# Patient Record
Sex: MALE | Race: WHITE | Employment: FULL TIME | URBAN - METROPOLITAN AREA
[De-identification: names, ages, dates, MRNs, and addresses within clinical notes are randomized per-mention and may not be internally consistent; named-entity substitution may affect disease eponyms.]

---

## 2020-01-27 RX ORDER — SODIUM CHLORIDE 0.9 % (FLUSH) 0.9 %
5-40 SYRINGE (ML) INJECTION EVERY 8 HOURS
Status: CANCELLED | OUTPATIENT
Start: 2020-01-27

## 2020-01-27 RX ORDER — SODIUM CHLORIDE 0.9 % (FLUSH) 0.9 %
5-40 SYRINGE (ML) INJECTION AS NEEDED
Status: CANCELLED | OUTPATIENT
Start: 2020-01-27

## 2020-01-31 NOTE — ADDENDUM NOTE
Addended by: Johnson Mcgarry on: 1/31/2020 07:18 AM     Modules accepted: Orders CC: Luiz Melvin is a 61 y.o. male is suffering from   Chief Complaint   Patient presents with   • Wound Check     left foot wound         SUBJECTIVE:  1. Diabetic ulcer of toe of left foot associated with type 2 diabetes mellitus, with muscle involvement without evidence of necrosis (HCC)  Luiz is here for follow-up has a diabetic foot ulcers associate with the base of the left great toe.  Is been seen by podiatry appears to be improving    2. Type 2 diabetes mellitus with diabetic neuropathy, without long-term current use of insulin (HCC)  Very well controlled type 2 diabetes        Past social, family, history:   Social History     Tobacco Use   • Smoking status: Never Smoker   • Smokeless tobacco: Never Used   Substance Use Topics   • Alcohol use: Yes     Alcohol/week: 3.0 oz     Types: 6 Cans of beer per week     Comment: moderate   • Drug use: No         MEDICATIONS:    Current Outpatient Medications:   •  minocycline (MINOCIN) 50 MG Cap, Take 50 mg by mouth., Disp: , Rfl: 0  •  atorvastatin (LIPITOR) 10 MG Tab, Take 1 Tab by mouth every evening., Disp: 90 Tab, Rfl: 3  •  metFORMIN ER (GLUCOPHAGE XR) 500 MG TABLET SR 24 HR, Take 2 Tabs by mouth 2 times a day., Disp: 360 Tab, Rfl: 1  •  glipiZIDE (GLUCOTROL) 5 MG Tab, Take 1 Tab by mouth 2 times a day. TAKE 1 TAB BY MOUTH 2 TIMES A DAY., Disp: 180 Tab, Rfl: 2  •  Dulaglutide (TRULICITY) 1.5 MG/0.5ML Solution Pen-injector, Inject 1.5 mg as instructed every 7 days., Disp: 12 PEN, Rfl: 3  •  gabapentin (NEURONTIN) 300 MG Cap, Take 2 Caps by mouth 3 times a day., Disp: 540 Cap, Rfl: 3  •  Empagliflozin (JARDIANCE) 10 MG Tab, Take 10 mg by mouth every morning before breakfast., Disp: 30 Tab, Rfl: 11  •  sildenafil citrate (VIAGRA) 50 MG tablet, Take 1 Tab by mouth as needed for Erectile Dysfunction., Disp: 10 Tab, Rfl: 3  •  aspirin EC (ECOTRIN) 81 MG TBEC, Take 162 mg by mouth every day., Disp: , Rfl:   •  Blood Glucose Monitoring Suppl SUPPLIES  "Misc, Test strips order: Test strips for Accucheck Daija meter. Sig: use qd and prn ssx high or low sugar #100 RF x 3, Disp: 100 Strip, Rfl: 3  •  Lancets Misc, Lancets order: Lancets for Accucheck Daija meter. Sig: use qd and prn ssx high or low sugar. #100 RF x 3, Disp: 100 Each, Rfl: 3  •  Multiple Vitamins-Minerals (MULTIVITAMIN PO), Take  by mouth every day., Disp: , Rfl:     PROBLEMS:  Patient Active Problem List    Diagnosis Date Noted   • Diabetic ulcer of toe of left foot associated with type 2 diabetes mellitus, with muscle involvement without evidence of necrosis (HCC) 11/13/2019   • ED (erectile dysfunction) 11/14/2017   • Onychomycosis 05/10/2017   • BMI 30.0-30.9,adult 07/19/2016   • Hyperlipemia 10/27/2015   • Abnormal EKG 06/24/2015   • Cataracts, bilateral 10/13/2014   • Type 2 diabetes mellitus with diabetic neuropathy ([facility]) 10/13/2014       REVIEW OF SYSTEMS:  Gen.:  No Nausea, Vomiting, fever, Chills.  Heart: No chest pain.  Lungs:  No shortness of Breath.  Psychological: Angel unusual Anxiety depression     PHYSICAL EXAM   Constitutional: Alert, cooperative, not in acute distress.  Cardiovascular:  Rate Rhythm is regular without murmurs rubs clicks.     Thorax & Lungs: Clear to auscultation, no wheezing, rhonchi, or rales  HENT: Normocephalic, Atraumatic.  Eyes: PERRLA, EOMI, Conjunctiva normal.   Neck: Trachia is midline no swelling of the thyroid.   Musculoskeletal: Ulcer base of left great toe inspected, appears to be healing no evidence of secondary infection  Neurologic: Alert & oriented x 3, cranial nerves II through XII are intact, Normal motor function, Normal sensory function, No focal deficits noted.   Psychiatric: Affect normal, Judgment normal, Mood normal.     VITAL SIGNS:/54   Pulse 78   Temp 36.6 °C (97.9 °F) (Temporal)   Resp 12   Ht 1.778 m (5' 10\")   Wt 95.1 kg (209 lb 9.6 oz)   SpO2 98%   BMI 30.07 kg/m²     Labs: Reviewed    Assessment:                            "                          Plan:    1. Diabetic ulcer of toe of left foot associated with type 2 diabetes mellitus, with muscle involvement without evidence of necrosis (HCC)  Continued follow-up with diabetes, labs ordered additional testing for 3 months  - MICROALBUMIN CREAT RATIO URINE (LAB COLLECT); Future  - REFERRAL TO OPHTHALMOLOGY  - HEMOGLOBIN A1C; Future    2. Type 2 diabetes mellitus with diabetic neuropathy, without long-term current use of insulin (HCC)  Follow-up in 3 months for diabetic exam patient is to follow-up in 2 weeks for left foot exam  - POCT Retinal Eye Exam  - Diabetic Monofilament Lower Extremity Exam  - HEMOGLOBIN A1C; Future

## 2020-02-12 ENCOUNTER — ANESTHESIA EVENT (OUTPATIENT)
Dept: SURGERY | Age: 60
End: 2020-02-12
Payer: COMMERCIAL

## 2020-02-13 ENCOUNTER — ANESTHESIA (OUTPATIENT)
Dept: SURGERY | Age: 60
End: 2020-02-13
Payer: COMMERCIAL

## 2020-02-13 ENCOUNTER — HOSPITAL ENCOUNTER (OUTPATIENT)
Age: 60
Setting detail: OUTPATIENT SURGERY
Discharge: HOME | End: 2020-02-13
Attending: ORTHOPAEDIC SURGERY | Admitting: ORTHOPAEDIC SURGERY
Payer: COMMERCIAL

## 2020-02-13 VITALS
SYSTOLIC BLOOD PRESSURE: 144 MMHG | DIASTOLIC BLOOD PRESSURE: 78 MMHG | RESPIRATION RATE: 16 BRPM | TEMPERATURE: 98 F | HEART RATE: 73 BPM | OXYGEN SATURATION: 91 % | WEIGHT: 177 LBS | BODY MASS INDEX: 25.4 KG/M2

## 2020-02-13 PROCEDURE — 77030013367: Performed by: ORTHOPAEDIC SURGERY

## 2020-02-13 PROCEDURE — 76060000033 HC ANESTHESIA 1 TO 1.5 HR: Performed by: ORTHOPAEDIC SURGERY

## 2020-02-13 PROCEDURE — 77030040936 HC WND COBLATN S&N -C: Performed by: ORTHOPAEDIC SURGERY

## 2020-02-13 PROCEDURE — 77030018836 HC SOL IRR NACL ICUM -A: Performed by: ORTHOPAEDIC SURGERY

## 2020-02-13 PROCEDURE — 74011250636 HC RX REV CODE- 250/636: Performed by: ORTHOPAEDIC SURGERY

## 2020-02-13 PROCEDURE — 74011000250 HC RX REV CODE- 250: Performed by: REGISTERED NURSE

## 2020-02-13 PROCEDURE — 77030003666 HC NDL SPINAL BD -A: Performed by: ORTHOPAEDIC SURGERY

## 2020-02-13 PROCEDURE — 77030040361 HC SLV COMPR DVT MDII -B: Performed by: ORTHOPAEDIC SURGERY

## 2020-02-13 PROCEDURE — 76210000020 HC REC RM PH II FIRST 0.5 HR: Performed by: ORTHOPAEDIC SURGERY

## 2020-02-13 PROCEDURE — A4565 SLINGS: HCPCS | Performed by: ORTHOPAEDIC SURGERY

## 2020-02-13 PROCEDURE — 74011250636 HC RX REV CODE- 250/636: Performed by: ANESTHESIOLOGY

## 2020-02-13 PROCEDURE — 77030002933 HC SUT MCRYL J&J -A: Performed by: ORTHOPAEDIC SURGERY

## 2020-02-13 PROCEDURE — 76942 ECHO GUIDE FOR BIOPSY: CPT | Performed by: ORTHOPAEDIC SURGERY

## 2020-02-13 PROCEDURE — 74011250636 HC RX REV CODE- 250/636: Performed by: NURSE ANESTHETIST, CERTIFIED REGISTERED

## 2020-02-13 PROCEDURE — 74011250637 HC RX REV CODE- 250/637: Performed by: ANESTHESIOLOGY

## 2020-02-13 PROCEDURE — 74011000250 HC RX REV CODE- 250: Performed by: ANESTHESIOLOGY

## 2020-02-13 PROCEDURE — 76010010054 HC POST OP PAIN BLOCK: Performed by: ORTHOPAEDIC SURGERY

## 2020-02-13 PROCEDURE — 77030040922 HC BLNKT HYPOTHRM STRY -A: Performed by: REGISTERED NURSE

## 2020-02-13 PROCEDURE — 77030019605: Performed by: ORTHOPAEDIC SURGERY

## 2020-02-13 PROCEDURE — 77030004453 HC BUR SHV STRY -B: Performed by: ORTHOPAEDIC SURGERY

## 2020-02-13 PROCEDURE — 76210000006 HC OR PH I REC 0.5 TO 1 HR: Performed by: ORTHOPAEDIC SURGERY

## 2020-02-13 PROCEDURE — 77030006891 HC BLD SHV RESECT STRY -B: Performed by: ORTHOPAEDIC SURGERY

## 2020-02-13 PROCEDURE — 74011250636 HC RX REV CODE- 250/636: Performed by: REGISTERED NURSE

## 2020-02-13 PROCEDURE — 77030003602 HC NDL NRV BLK BBMI -B: Performed by: ANESTHESIOLOGY

## 2020-02-13 PROCEDURE — 77030010509 HC AIRWY LMA MSK TELE -A: Performed by: REGISTERED NURSE

## 2020-02-13 PROCEDURE — 76010000160 HC OR TIME 0.5 TO 1 HR INTENSV-TIER 1: Performed by: ORTHOPAEDIC SURGERY

## 2020-02-13 RX ORDER — CEFAZOLIN SODIUM/WATER 2 G/20 ML
2 SYRINGE (ML) INTRAVENOUS ONCE
Status: COMPLETED | OUTPATIENT
Start: 2020-02-13 | End: 2020-02-13

## 2020-02-13 RX ORDER — EPHEDRINE SULFATE/0.9% NACL/PF 50 MG/5 ML
SYRINGE (ML) INTRAVENOUS AS NEEDED
Status: DISCONTINUED | OUTPATIENT
Start: 2020-02-13 | End: 2020-02-13 | Stop reason: HOSPADM

## 2020-02-13 RX ORDER — SODIUM CHLORIDE, SODIUM LACTATE, POTASSIUM CHLORIDE, CALCIUM CHLORIDE 600; 310; 30; 20 MG/100ML; MG/100ML; MG/100ML; MG/100ML
75 INJECTION, SOLUTION INTRAVENOUS CONTINUOUS
Status: DISCONTINUED | OUTPATIENT
Start: 2020-02-13 | End: 2020-02-13 | Stop reason: HOSPADM

## 2020-02-13 RX ORDER — GABAPENTIN 300 MG/1
300 CAPSULE ORAL ONCE
Status: COMPLETED | OUTPATIENT
Start: 2020-02-13 | End: 2020-02-13

## 2020-02-13 RX ORDER — SODIUM CHLORIDE 0.9 % (FLUSH) 0.9 %
5-40 SYRINGE (ML) INJECTION EVERY 8 HOURS
Status: DISCONTINUED | OUTPATIENT
Start: 2020-02-13 | End: 2020-02-13 | Stop reason: HOSPADM

## 2020-02-13 RX ORDER — LIDOCAINE HYDROCHLORIDE 10 MG/ML
0.1 INJECTION INFILTRATION; PERINEURAL AS NEEDED
Status: DISCONTINUED | OUTPATIENT
Start: 2020-02-13 | End: 2020-02-13 | Stop reason: HOSPADM

## 2020-02-13 RX ORDER — ONDANSETRON 2 MG/ML
INJECTION INTRAMUSCULAR; INTRAVENOUS AS NEEDED
Status: DISCONTINUED | OUTPATIENT
Start: 2020-02-13 | End: 2020-02-13 | Stop reason: HOSPADM

## 2020-02-13 RX ORDER — DIPHENHYDRAMINE HYDROCHLORIDE 50 MG/ML
12.5 INJECTION, SOLUTION INTRAMUSCULAR; INTRAVENOUS
Status: DISCONTINUED | OUTPATIENT
Start: 2020-02-13 | End: 2020-02-13 | Stop reason: HOSPADM

## 2020-02-13 RX ORDER — HYDROMORPHONE HYDROCHLORIDE 2 MG/ML
0.5 INJECTION, SOLUTION INTRAMUSCULAR; INTRAVENOUS; SUBCUTANEOUS
Status: DISCONTINUED | OUTPATIENT
Start: 2020-02-13 | End: 2020-02-13 | Stop reason: HOSPADM

## 2020-02-13 RX ORDER — OXYCODONE HYDROCHLORIDE 5 MG/1
5 TABLET ORAL
Status: DISCONTINUED | OUTPATIENT
Start: 2020-02-13 | End: 2020-02-13 | Stop reason: HOSPADM

## 2020-02-13 RX ORDER — EPINEPHRINE 1 MG/ML
INJECTION INTRAMUSCULAR; INTRAVENOUS; SUBCUTANEOUS AS NEEDED
Status: DISCONTINUED | OUTPATIENT
Start: 2020-02-13 | End: 2020-02-13 | Stop reason: HOSPADM

## 2020-02-13 RX ORDER — BUPIVACAINE HYDROCHLORIDE AND EPINEPHRINE 5; 5 MG/ML; UG/ML
INJECTION, SOLUTION EPIDURAL; INTRACAUDAL; PERINEURAL
Status: COMPLETED | OUTPATIENT
Start: 2020-02-13 | End: 2020-02-13

## 2020-02-13 RX ORDER — DEXAMETHASONE SODIUM PHOSPHATE 4 MG/ML
INJECTION, SOLUTION INTRA-ARTICULAR; INTRALESIONAL; INTRAMUSCULAR; INTRAVENOUS; SOFT TISSUE AS NEEDED
Status: DISCONTINUED | OUTPATIENT
Start: 2020-02-13 | End: 2020-02-13 | Stop reason: HOSPADM

## 2020-02-13 RX ORDER — FENTANYL CITRATE 50 UG/ML
100 INJECTION, SOLUTION INTRAMUSCULAR; INTRAVENOUS AS NEEDED
Status: COMPLETED | OUTPATIENT
Start: 2020-02-13 | End: 2020-02-13

## 2020-02-13 RX ORDER — FLUMAZENIL 0.1 MG/ML
0.2 INJECTION INTRAVENOUS
Status: DISCONTINUED | OUTPATIENT
Start: 2020-02-13 | End: 2020-02-13 | Stop reason: HOSPADM

## 2020-02-13 RX ORDER — ACETAMINOPHEN 500 MG
1000 TABLET ORAL ONCE
Status: COMPLETED | OUTPATIENT
Start: 2020-02-13 | End: 2020-02-13

## 2020-02-13 RX ORDER — SODIUM CHLORIDE, SODIUM LACTATE, POTASSIUM CHLORIDE, CALCIUM CHLORIDE 600; 310; 30; 20 MG/100ML; MG/100ML; MG/100ML; MG/100ML
100 INJECTION, SOLUTION INTRAVENOUS CONTINUOUS
Status: DISCONTINUED | OUTPATIENT
Start: 2020-02-13 | End: 2020-02-13 | Stop reason: HOSPADM

## 2020-02-13 RX ORDER — PROPOFOL 10 MG/ML
INJECTION, EMULSION INTRAVENOUS AS NEEDED
Status: DISCONTINUED | OUTPATIENT
Start: 2020-02-13 | End: 2020-02-13 | Stop reason: HOSPADM

## 2020-02-13 RX ORDER — LIDOCAINE HYDROCHLORIDE 20 MG/ML
INJECTION, SOLUTION EPIDURAL; INFILTRATION; INTRACAUDAL; PERINEURAL AS NEEDED
Status: DISCONTINUED | OUTPATIENT
Start: 2020-02-13 | End: 2020-02-13 | Stop reason: HOSPADM

## 2020-02-13 RX ORDER — MIDAZOLAM HYDROCHLORIDE 1 MG/ML
2 INJECTION, SOLUTION INTRAMUSCULAR; INTRAVENOUS
Status: COMPLETED | OUTPATIENT
Start: 2020-02-13 | End: 2020-02-13

## 2020-02-13 RX ORDER — SODIUM CHLORIDE 0.9 % (FLUSH) 0.9 %
5-40 SYRINGE (ML) INJECTION AS NEEDED
Status: DISCONTINUED | OUTPATIENT
Start: 2020-02-13 | End: 2020-02-13 | Stop reason: HOSPADM

## 2020-02-13 RX ORDER — NALOXONE HYDROCHLORIDE 0.4 MG/ML
0.1 INJECTION, SOLUTION INTRAMUSCULAR; INTRAVENOUS; SUBCUTANEOUS AS NEEDED
Status: DISCONTINUED | OUTPATIENT
Start: 2020-02-13 | End: 2020-02-13 | Stop reason: HOSPADM

## 2020-02-13 RX ADMIN — Medication 5 MG: at 14:55

## 2020-02-13 RX ADMIN — PHENYLEPHRINE HYDROCHLORIDE 100 MCG: 10 INJECTION INTRAVENOUS at 15:04

## 2020-02-13 RX ADMIN — SODIUM CHLORIDE, SODIUM LACTATE, POTASSIUM CHLORIDE, AND CALCIUM CHLORIDE 100 ML/HR: 600; 310; 30; 20 INJECTION, SOLUTION INTRAVENOUS at 12:39

## 2020-02-13 RX ADMIN — SODIUM CHLORIDE, SODIUM LACTATE, POTASSIUM CHLORIDE, AND CALCIUM CHLORIDE 100 ML/HR: 600; 310; 30; 20 INJECTION, SOLUTION INTRAVENOUS at 12:36

## 2020-02-13 RX ADMIN — PHENYLEPHRINE HYDROCHLORIDE 50 MCG: 10 INJECTION INTRAVENOUS at 14:48

## 2020-02-13 RX ADMIN — FENTANYL CITRATE 100 MCG: 50 INJECTION, SOLUTION INTRAMUSCULAR; INTRAVENOUS at 13:28

## 2020-02-13 RX ADMIN — ACETAMINOPHEN 1000 MG: 500 TABLET, FILM COATED ORAL at 12:36

## 2020-02-13 RX ADMIN — GABAPENTIN 300 MG: 300 CAPSULE ORAL at 12:36

## 2020-02-13 RX ADMIN — PROPOFOL 180 MG: 10 INJECTION, EMULSION INTRAVENOUS at 14:34

## 2020-02-13 RX ADMIN — PHENYLEPHRINE HYDROCHLORIDE 50 MCG: 10 INJECTION INTRAVENOUS at 14:45

## 2020-02-13 RX ADMIN — Medication 5 MG: at 15:07

## 2020-02-13 RX ADMIN — Medication 2 G: at 14:44

## 2020-02-13 RX ADMIN — PHENYLEPHRINE HYDROCHLORIDE 100 MCG: 10 INJECTION INTRAVENOUS at 15:07

## 2020-02-13 RX ADMIN — LIDOCAINE HYDROCHLORIDE 80 MG: 20 INJECTION, SOLUTION EPIDURAL; INFILTRATION; INTRACAUDAL; PERINEURAL at 14:34

## 2020-02-13 RX ADMIN — ONDANSETRON 4 MG: 2 INJECTION INTRAMUSCULAR; INTRAVENOUS at 15:12

## 2020-02-13 RX ADMIN — DEXAMETHASONE SODIUM PHOSPHATE 4 MG: 4 INJECTION, SOLUTION INTRAMUSCULAR; INTRAVENOUS at 14:44

## 2020-02-13 RX ADMIN — BUPIVACAINE HYDROCHLORIDE AND EPINEPHRINE BITARTRATE 30 ML: 5; .005 INJECTION, SOLUTION EPIDURAL; INTRACAUDAL; PERINEURAL at 13:30

## 2020-02-13 RX ADMIN — Medication 5 MG: at 14:48

## 2020-02-13 RX ADMIN — MIDAZOLAM 2 MG: 1 INJECTION INTRAMUSCULAR; INTRAVENOUS at 13:28

## 2020-02-13 RX ADMIN — PHENYLEPHRINE HYDROCHLORIDE 100 MCG: 10 INJECTION INTRAVENOUS at 14:55

## 2020-02-13 NOTE — ANESTHESIA PROCEDURE NOTES
Peripheral Block    Start time: 2/13/2020 1:28 PM  End time: 2/13/2020 1:31 PM  Performed by: Jennie Hammonds MD  Authorized by: Jennie Hammonds MD       Pre-procedure: Indications: at surgeon's request and post-op pain management    Preanesthetic Checklist: patient identified, risks and benefits discussed, site marked, timeout performed, anesthesia consent given and patient being monitored    Timeout Time: 13:28          Block Type:   Block Type:   Interscalene  Laterality:  Left  Monitoring:  Standard ASA monitoring, continuous pulse ox, heart rate, oxygen, responsive to questions and frequent vital sign checks  Injection Technique:  Single shot  Procedures: ultrasound guided    Patient Position: seated  Prep: chlorhexidine    Location:  Interscalene  Needle Type:  Stimuplex  Needle Gauge:  20 G  Needle Localization:  Ultrasound guidance    Assessment:  Number of attempts:  1  Injection Assessment:  Incremental injection every 5 mL, local visualized surrounding nerve on ultrasound, negative aspiration for CSF, negative aspiration for blood, no paresthesia, no intravascular symptoms and ultrasound image on chart  Patient tolerance:  Patient tolerated the procedure well with no immediate complications

## 2020-02-13 NOTE — BRIEF OP NOTE
BRIEF OPERATIVE NOTE    Date of Procedure: 2/13/2020   Preoperative Diagnosis: Incomplete rotator cuff tear or rupture of left shoulder, not specified as traumatic [M75.112]  Adhesive capsulitis of left shoulder [M75.02]  Postoperative Diagnosis: Incomplete rotator cuff tear or rupture of left shoulder, not specified as traumatic [M75.112]  Adhesive capsulitis of left shoulder [M75.02]    Procedure(s):  LEFT SHOULDER ARTHROSCOPIC DISTAL CLAVICLE RESECTION/ SYNOVECTOMY/ MANIPULATION  Surgeon(s) and Role:      Alexandra Malcolm MD - Primary         Surgical Assistant:           Surgical Staff:  Circ-1: Sherri Mcdonald RN  Scrub Tech-1: Charles Nielsen  Scrub Tech-2: Kenyatta Morris Time In Time Out   Incision Start 1448    Incision Close 1517      Anesthesia: General   Estimated Blood Loss:     Specimens: * No specimens in log *   Findings:      Complications:     Implants: * No implants in log *

## 2020-02-13 NOTE — ANESTHESIA PREPROCEDURE EVALUATION
Anesthetic History   No history of anesthetic complications            Review of Systems / Medical History  Patient summary reviewed and pertinent labs reviewed    Pulmonary  Within defined limits                 Neuro/Psych   Within defined limits           Cardiovascular              Hyperlipidemia    Exercise tolerance: >4 METS: Active without chest pain, SOB, syncope     GI/Hepatic/Renal     GERD: well controlled           Endo/Other        Arthritis     Other Findings   Comments: Chronic pain           Physical Exam    Airway  Mallampati: II  TM Distance: 4 - 6 cm  Neck ROM: normal range of motion   Mouth opening: Normal     Cardiovascular    Rhythm: regular  Rate: normal         Dental  No notable dental hx       Pulmonary  Breath sounds clear to auscultation               Abdominal  GI exam deferred       Other Findings            Anesthetic Plan    ASA: 2  Anesthesia type: general  LMA    Post-op pain plan if not by surgeon: peripheral nerve block single      Anesthetic plan and risks discussed with: Patient and Spouse

## 2020-02-13 NOTE — ANESTHESIA POSTPROCEDURE EVALUATION
Procedure(s):  LEFT SHOULDER ARTHROSCOPIC DISTAL CLAVICLE RESECTION/ SYNOVECTOMY/ MANIPULATION. general    Anesthesia Post Evaluation        Patient location during evaluation: PACU  Patient participation: complete - patient participated  Level of consciousness: responsive to verbal stimuli  Pain management: adequate  Airway patency: patent  Anesthetic complications: no  Cardiovascular status: acceptable  Respiratory status: acceptable  Hydration status: euvolemic        Vitals Value Taken Time   /76 2/13/2020  4:07 PM   Temp 36.6 °C (97.9 °F) 2/13/2020  3:30 PM   Pulse 69 2/13/2020  4:17 PM   Resp 16 2/13/2020  3:38 PM   SpO2 94 % 2/13/2020  4:17 PM   Vitals shown include unvalidated device data.

## 2020-02-13 NOTE — DISCHARGE INSTRUCTIONS
Post-Operative Instructions   For  Shoulder Arthroscopy  Phone:  [phone]    1. Apply ice to the shoulder as needed. 20 to 30 minutes at a time while awake    2. You may shower after the arthroscopy. Keep dressings in place for the first three days and do not get them wet. After three days, you may remove the dressings and leave the steri-strip bandages in place; they will peel off naturally. 3. You may discontinue use of your sling and begin active range of motion of the elbow as tolerated by pain, unless you are instructed otherwise. Do not lift arm by your side for 4 weeks. 4. Begin therapy as ordered. 5. Use any pain medication as instructed. You should take your pain medication as soon as you feel the anesthetic wearing off. Do not wait until you are in severe pain to begin taking your pain medication. 6. You may have some side effects from your pain medication. If you have nausea, try taking your medication with food. For itching, you may take over the counter Benadryl. 7. You may have been given a prescription for Phenergan. This medication is used for nausea and vomiting. You do not need to get this prescription filled unless you have a problem. 8. If you have a problem, please call [facility] at [phone], P.A. DIET  · Clear liquids until no nausea or vomiting; then light diet for the first day. · Advance to regular diet on second day, unless your doctor orders otherwise. · If nausea and vomiting continues, call your doctor. PAIN  · Take pain medication as directed by your doctor. · Call your doctor if pain is NOT relieved by medication.      CALL YOUR DOCTOR IF   · Excessive bleeding that does not stop after holding pressure over the area  · Temperature of 101 degrees F or above  · Excessive redness, swelling or bruising, and/ or green or yellow, smelly discharge from incision    AFTER ANESTHESIA   · For the first 24 hours: DO NOT Drive, Drink alcoholic beverages, or Make important decisions. · Be aware of dizziness following anesthesia and while taking pain medication. APPOINTMENT DATE/ TIME  His office will call you    YOUR DOCTOR'S PHONE NUMBER [phone]      DISCHARGE SUMMARY from Nurse    PATIENT INSTRUCTIONS:    After general anesthesia or intravenous sedation, for 24 hours or while taking prescription Narcotics:  · Limit your activities  · Do not drive and operate hazardous machinery  · Do not make important personal or business decisions  · Do  not drink alcoholic beverages  · If you have not urinated within 8 hours after discharge, please contact your surgeon on call. *  Please give a list of your current medications to your Primary Care Provider. *  Please update this list whenever your medications are discontinued, doses are      changed, or new medications (including over-the-counter products) are added. *  Please carry medication information at all times in case of emergency situations. These are general instructions for a healthy lifestyle:    No smoking/ No tobacco products/ Avoid exposure to second hand smoke    Surgeon General's Warning:  Quitting smoking now greatly reduces serious risk to your health. Obesity, smoking, and sedentary lifestyle greatly increases your risk for illness    A healthy diet, regular physical exercise & weight monitoring are important for maintaining a healthy lifestyle    You may be retaining fluid if you have a history of heart failure or if you experience any of the following symptoms:  Weight gain of 3 pounds or more overnight or 5 pounds in a week, increased swelling in our hands or feet or shortness of breath while lying flat in bed. Please call your doctor as soon as you notice any of these symptoms; do not wait until your next office visit.     Recognize signs and symptoms of STROKE:    F-face looks uneven    A-arms unable to move or move unevenly    S-speech slurred or non-existent    T-time-call 911 as soon as signs and symptoms begin-DO NOT go       Back to bed or wait to see if you get better-TIME IS BRAIN.

## 2020-02-13 NOTE — H&P
Outpatient Surgery History and Physical:  Jairon Cotto was seen and examined. CHIEF COMPLAINT:    LT shoulder . PE:     Visit Vitals  /79 (BP 1 Location: Right arm, BP Patient Position: At rest)   Pulse 80   Temp 97.9 °F (36.6 °C)   Resp 17   Wt 80.3 kg (177 lb)   SpO2 97%   BMI 25.40 kg/m²       Heart:   Regular rhythm      Lungs:  Are clear      Past Medical History:    Patient Active Problem List    Diagnosis    Mixed hyperlipidemia    Impotence of organic origin       Surgical History:   Past Surgical History:   Procedure Laterality Date    HX COLONOSCOPY      HX ORTHOPAEDIC Right 9/2014    shoulder glenoid repair    HX OTHER SURGICAL  1994    vocal cord polyp    HX SHOULDER ARTHROSCOPY Right 12/16/10    subacromial depression, repair of bony bankart ([place])    HX SHOULDER ARTHROSCOPY Right 4/21/2011    capsular release    HX TONSILLECTOMY         Social History: Patient  reports that he has been smoking. He has a 10.00 pack-year smoking history. He has never used smokeless tobacco. He reports current alcohol use of about 5.0 standard drinks of alcohol per week. He reports previous drug use. Family History:   Family History   Problem Relation Age of Onset    Heart Disease Father     Heart Surgery Father         stent    Hypertension Father     Coronary Artery Disease Father     Heart Disease Mother         \"some type of vague heart issue\"   [facility] Cancer Mother        Allergies: Reviewed per EMR  Allergies   Allergen Reactions    Aspirin Anaphylaxis and Hives       Medications:    No current facility-administered medications on file prior to encounter. Current Outpatient Medications on File Prior to Encounter   Medication Sig    avanafil (STENDRA) 100 mg tablet Take  by mouth as needed for Other.  ranitidine (ZANTAC) 150 mg tablet Take 150 mg by mouth two (2) times a day. The surgery is planned for the  LT shoulder. History and physical has been reviewed.  The patient has been examined. There have been no significant clinical changes since the completion of the originally dated History and Physical.  Patient identified by surgeon; surgical site was confirmed by patient and surgeon. The patient is here today for outpatient surgery. I have examined the patient, no changes are noted in the patient's medical status. Necessity for the procedure/care is still present and the history and physical above is current. See the office notes for the full long term history of the problem. Please see the recent office notes for the musculoskeletal examination.     Signed By: Shane Lujan MD     February 13, 2020 12:40 PM

## 2020-02-13 NOTE — OP NOTES
[facility]  OPERATIVE REPORT    Name:  Sonido Kelley  MR#:  [id number]  :  1960  ACCOUNT #:  [de-identified]  DATE OF SERVICE:  2020    PREOPERATIVE DIAGNOSIS:  Acromioclavicular joint arthritis of the left shoulder, possible rotator cuff tear. POSTOPERATIVE DIAGNOSES:  1. Acromioclavicular joint arthritis, left shoulder. 2.  Adhesive capsulitis of the left shoulder. 3.  Impingement of the left shoulder. 4.  Small bursal tear of the rotator cuff. PROCEDURES PERFORMED:  1. Arthroscopic resection of distal clavicle, [id number].  2.  Synovectomy of the left shoulder, 45315.  3.  Subacromial decompression, 75348.  4.  Manipulation of the left shoulder. SURGEON:  Nicole Guadalupe MD    ASSISTANT:  None. ANESTHESIA:  General.    COMPLICATIONS:  None. SPECIMENS REMOVED:  None. IMPLANTS:  None. ESTIMATED BLOOD LOSS:  Minimal.    DESCRIPTION OF PROCEDURE:  After an adequate level of general anesthesia was obtained, the patient's left shoulder was prepped and draped in the usual sterile fashion. He had some limited range of motion under anesthesia with a lack of full abduction and an external rotation. The shoulder was arthroscoped and the joint was distended with saline and the arthroscope introduced. The articular surface looked fine. He had a moderate degree of synovitis in the joint. Anterior portal made in the soft spot and a synovectomy was performed. The shoulder was then manipulated regaining all his motion and he was particularly tightened abduction and external rotation, was able to get all his motion back and the shoulder was arthroscoped again, no other abnormalities noted intra-articular. The arthroscope was placed in the subacromial space. A lateral portal was made. The patient had some mild hooking of the acromion. A decompression was performed with the shaver and the bur removing the anterior and lateral osteophytes.   He had degenerative changes noted on the end of the distal clavicle. The rotator cuff was well visualized. He had some bursal tearing, but he still had good integrity and the small bursal area was debrided with the shaver, but he still had good integrity and did not need a repair. He still felt a lot of his pain secondary to his adhesive capsulitis. Had degenerative changes noted on the end of distal clavicle. Circumferentially, the soft tissue was then removed and the inferior osteophytes removed from the lateral portal removing about a centimeter with a bur and then through the anterior portal with pressure applied superiorly, circumferentially the soft tissue was removed and then a bur was used to remove the superior osteophytes. The wound was then copiously irrigated. Steri-Strips, sterile dressings applied. The arm was placed in a sling. Tolerated the procedure well.     Yadira Ware MD      [id number]  D:  02/13/2020 15:22  T:  02/13/2020 16:46  JOB #:  [id number]  CC:  [facility]

## (undated) DEVICE — [RESECTOR CUTTER, ARTHROSCOPIC SHAVER BLADE,  DO NOT RESTERILIZE,  DO NOT USE IF PACKAGE IS DAMAGED,  KEEP DRY,  KEEP AWAY FROM SUNLIGHT]: Brand: FORMULA

## (undated) DEVICE — INTENDED FOR TISSUE SEPARATION, AND OTHER PROCEDURES THAT REQUIRE A SHARP SURGICAL BLADE TO PUNCTURE OR CUT.: Brand: BARD-PARKER ® STAINLESS STEEL BLADES

## (undated) DEVICE — SET IRRIG W 96IN TBNG 4 LN FLX BG

## (undated) DEVICE — SOLUTION IRRIG 3000ML 0.9% SOD CHL FLX CONT 0797208] ICU MEDICAL INC]

## (undated) DEVICE — SOFT SILICONE HYDROCELLULAR FOAM DRESSING WITH LOCK AWAY LAYER: Brand: ALLEVYN LIFE XL 21X21 CTN10

## (undated) DEVICE — [THREADED CANNULA.  DO NOT RESTERILIZE,  DO NOT USE IF PACKAGE IS DAMAGED]: Brand: DRI-LOK

## (undated) DEVICE — [AGGRESSIVE 6-FLUTE BARREL BUR, ARTHROSCOPIC SHAVER BLADE,  DO NOT RESTERILIZE,  DO NOT USE IF PACKAGE IS DAMAGED,  KEEP DRY,  KEEP AWAY FROM SUNLIGHT]: Brand: FORMULA

## (undated) DEVICE — WEREWOLF FLOW 90 COBLATION WAND: Brand: COBLATION

## (undated) DEVICE — SHLDR ARTHO LEE: Brand: MEDLINE INDUSTRIES, INC.

## (undated) DEVICE — (D)PREP SKN CHLRAPRP APPL 26ML -- CONVERT TO ITEM 371833

## (undated) DEVICE — SYR 50ML LR LCK 1ML GRAD NSAF --

## (undated) DEVICE — GARMENT,MEDLINE,DVT,INT,CALF,MED, GEN2: Brand: MEDLINE

## (undated) DEVICE — NEEDLE HYPO 18GA L1.5IN PNK S STL HUB POLYPR SHLD REG BVL

## (undated) DEVICE — STOCKINETTE ORTH W9XL36IN COT 2 PLY HLLW FOR HANDLING LMB

## (undated) DEVICE — STRIP,CLOSURE,WOUND,MEDI-STRIP,1/2X4: Brand: MEDLINE

## (undated) DEVICE — SUTURE MCRYL SZ 3-0 L27IN ABSRB UD L19MM PS-2 3/8 CIR PRIM Y427H

## (undated) DEVICE — BANDAGE COBAN 6 IN WND 6INX5YD FOAM

## (undated) DEVICE — DRAPE,SHOULDER,BEACH CHAIR,STERILE: Brand: MEDLINE

## (undated) DEVICE — SLING ARM 2-37INX8-17IN PCH -- MED

## (undated) DEVICE — RESTRAINT UNIVERSAL HEAD DISP --

## (undated) DEVICE — NDL SPNE QNCKE 18GX3.5IN LF --

## (undated) DEVICE — MASTISOL ADHESIVE LIQ 2/3ML

## (undated) DEVICE — SET IRRIG DST FLX M CONN